# Patient Record
Sex: MALE | Race: BLACK OR AFRICAN AMERICAN | NOT HISPANIC OR LATINO | Employment: OTHER | URBAN - METROPOLITAN AREA
[De-identification: names, ages, dates, MRNs, and addresses within clinical notes are randomized per-mention and may not be internally consistent; named-entity substitution may affect disease eponyms.]

---

## 2024-03-15 PROBLEM — D84.9 IMMUNOSUPPRESSION (MULTI): Status: ACTIVE | Noted: 2024-03-15

## 2024-03-15 PROBLEM — E78.2 MIXED HYPERLIPIDEMIA: Status: ACTIVE | Noted: 2024-03-15

## 2024-03-15 PROBLEM — Z86.73 H/O: CVA (CEREBROVASCULAR ACCIDENT): Status: ACTIVE | Noted: 2024-03-15

## 2024-03-15 PROBLEM — E66.811 CLASS 1 OBESITY WITH BODY MASS INDEX (BMI) OF 31.0 TO 31.9 IN ADULT: Status: ACTIVE | Noted: 2024-03-15

## 2024-03-15 PROBLEM — E11.9 DIABETES MELLITUS (MULTI): Status: ACTIVE | Noted: 2024-03-15

## 2024-03-15 PROBLEM — I10 ESSENTIAL HYPERTENSION: Status: ACTIVE | Noted: 2024-03-15

## 2024-03-15 PROBLEM — Z94.0 HISTORY OF KIDNEY TRANSPLANT (HHS-HCC): Status: ACTIVE | Noted: 2024-03-15

## 2024-03-15 PROBLEM — I25.10 CAD S/P PERCUTANEOUS CORONARY ANGIOPLASTY: Status: ACTIVE | Noted: 2024-03-15

## 2024-03-15 PROBLEM — Z78.9 NEVER SMOKED ANY SUBSTANCE: Status: ACTIVE | Noted: 2024-03-15

## 2024-03-15 PROBLEM — N52.9 MALE ERECTILE DISORDER OF ORGANIC ORIGIN: Status: ACTIVE | Noted: 2024-03-15

## 2024-03-15 PROBLEM — E66.9 CLASS 1 OBESITY WITH BODY MASS INDEX (BMI) OF 31.0 TO 31.9 IN ADULT: Status: ACTIVE | Noted: 2024-03-15

## 2024-03-15 PROBLEM — E66.3 OVERWEIGHT WITH BODY MASS INDEX (BMI) OF 29 TO 29.9 IN ADULT: Status: ACTIVE | Noted: 2024-03-15

## 2024-03-15 PROBLEM — E66.9 OBESITY: Status: ACTIVE | Noted: 2024-03-15

## 2024-03-15 PROBLEM — Z98.61 CAD S/P PERCUTANEOUS CORONARY ANGIOPLASTY: Status: ACTIVE | Noted: 2024-03-15

## 2024-03-15 RX ORDER — METOPROLOL TARTRATE 50 MG/1
50 TABLET ORAL
COMMUNITY
Start: 2019-02-04

## 2024-03-15 RX ORDER — ASPIRIN 81 MG/1
81 TABLET ORAL DAILY
COMMUNITY
Start: 2015-11-09

## 2024-03-15 RX ORDER — PREDNISONE 5 MG/1
5 TABLET ORAL DAILY
COMMUNITY
Start: 2015-11-09

## 2024-03-15 RX ORDER — ATORVASTATIN CALCIUM 40 MG/1
40 TABLET, FILM COATED ORAL DAILY
COMMUNITY
Start: 2015-11-09

## 2024-03-15 RX ORDER — AMLODIPINE BESYLATE 5 MG/1
5 TABLET ORAL DAILY
COMMUNITY
Start: 2022-03-01

## 2024-03-15 RX ORDER — DOCUSATE SODIUM 100 MG/1
100 CAPSULE, LIQUID FILLED ORAL 2 TIMES DAILY
COMMUNITY
Start: 2016-03-04

## 2024-03-15 RX ORDER — BRIMONIDINE TARTRATE AND TIMOLOL MALEATE 2; 5 MG/ML; MG/ML
SOLUTION OPHTHALMIC
COMMUNITY
Start: 2016-04-13

## 2024-03-15 RX ORDER — TRAVOPROST OPHTHALMIC SOLUTION 0.04 MG/ML
1 SOLUTION OPHTHALMIC NIGHTLY
COMMUNITY
Start: 2016-04-18

## 2024-03-15 RX ORDER — BRINZOLAMIDE 10 MG/ML
1 SUSPENSION/ DROPS OPHTHALMIC 2 TIMES DAILY
COMMUNITY
Start: 2016-04-18

## 2024-03-15 RX ORDER — QUINIDINE SULFATE 200 MG
TABLET ORAL
COMMUNITY
Start: 2022-03-01

## 2024-03-15 RX ORDER — MYCOPHENOLATE MOFETIL 250 MG/1
250 CAPSULE ORAL 2 TIMES DAILY
COMMUNITY
Start: 2015-11-09

## 2024-03-15 RX ORDER — INSULIN GLARGINE 100 [IU]/ML
INJECTION, SOLUTION SUBCUTANEOUS
COMMUNITY
Start: 2015-11-09

## 2024-07-24 ENCOUNTER — APPOINTMENT (OUTPATIENT)
Dept: CARDIOLOGY | Facility: CLINIC | Age: 64
End: 2024-07-24
Payer: MEDICARE

## 2024-07-24 VITALS
BODY MASS INDEX: 26.63 KG/M2 | WEIGHT: 180.3 LBS | DIASTOLIC BLOOD PRESSURE: 64 MMHG | HEART RATE: 90 BPM | SYSTOLIC BLOOD PRESSURE: 98 MMHG

## 2024-07-24 DIAGNOSIS — Z98.61 CAD S/P PERCUTANEOUS CORONARY ANGIOPLASTY: ICD-10-CM

## 2024-07-24 DIAGNOSIS — Z78.9 NEVER SMOKED ANY SUBSTANCE: ICD-10-CM

## 2024-07-24 DIAGNOSIS — Z86.73 H/O: CVA (CEREBROVASCULAR ACCIDENT): ICD-10-CM

## 2024-07-24 DIAGNOSIS — E11.9 TYPE 2 DIABETES MELLITUS WITHOUT COMPLICATION, WITHOUT LONG-TERM CURRENT USE OF INSULIN (MULTI): ICD-10-CM

## 2024-07-24 DIAGNOSIS — I10 ESSENTIAL HYPERTENSION: ICD-10-CM

## 2024-07-24 DIAGNOSIS — E78.2 MIXED HYPERLIPIDEMIA: ICD-10-CM

## 2024-07-24 DIAGNOSIS — I25.10 CAD S/P PERCUTANEOUS CORONARY ANGIOPLASTY: ICD-10-CM

## 2024-07-24 DIAGNOSIS — Z94.0 HISTORY OF KIDNEY TRANSPLANT (HHS-HCC): ICD-10-CM

## 2024-07-24 PROCEDURE — 3078F DIAST BP <80 MM HG: CPT | Performed by: INTERNAL MEDICINE

## 2024-07-24 PROCEDURE — 99214 OFFICE O/P EST MOD 30 MIN: CPT | Performed by: INTERNAL MEDICINE

## 2024-07-24 PROCEDURE — 3074F SYST BP LT 130 MM HG: CPT | Performed by: INTERNAL MEDICINE

## 2024-07-24 PROCEDURE — 1036F TOBACCO NON-USER: CPT | Performed by: INTERNAL MEDICINE

## 2024-07-24 NOTE — PROGRESS NOTES
CARDIOLOGY OFFICE VISIT      CHIEF COMPLAINT      HISTORY OF PRESENT ILLNESS  The patient states she has been doing well.  He denies chest discomfort or symptoms of myocardial ischemia.  He has not used nitroglycerin.  He denies dyspnea with exertion.  He denies palpitations and syncope.  He denies any problem with his current medication.  He states he has lab work done on regular basis [facility] and his cholesterol is good.      IMPRESSION:   1. Coronary artery disease, no angina.  2. Remote percutaneous coronary intervention.  3. Essential hypertension.  4. Mixed hyperlipidemia.  5. Diabetes mellitus type 2, requiring insulin control.  6. Obesity  7. Renal transplantation at [facility] April 2013.  8. Remote CVA  9. Vitiligo     Please excuse any errors in grammar or translation related to this dictation. Voice recognition software was utilized to prepare this document.     Past Medical History  Past Medical History:   Diagnosis Date    Other specified diabetes mellitus with other diabetic kidney complication (Multi) 06/02/2016    Diabetes mellitus of other type with microalbuminuria, without long-term current use of insulin    Personal history of other endocrine, nutritional and metabolic disease     History of diabetes mellitus    Presence of urogenital implants     History of penile implant       Social History  Social History     Tobacco Use    Smoking status: Not on file    Smokeless tobacco: Not on file   Substance Use Topics    Alcohol use: Not on file    Drug use: Not on file       Family History     Family History   Problem Relation Name Age of Onset    Other (arteriosclerotic cardiovascular disease) Mother      Other (cardiac disorder) Mother      Other (diabetes mellitus) Mother      Other (cardiac disorder) Father      Other (diabetes mellitus) Father          Allergies:  No Known Allergies     Outpatient Medications:  Current Outpatient Medications   Medication Instructions    amLODIPine  (NORVASC) 5 mg, oral, Daily    aspirin 81 mg, oral, Daily    atorvastatin (LIPITOR) 40 mg, oral, Daily    brimonidine-timoloL (Combigan) 0.2-0.5 % ophthalmic solution ophthalmic (eye)    brinzolamide (Azopt) 1 % ophthalmic suspension 1 drop, ophthalmic (eye), 2 times daily    docusate sodium (COLACE) 100 mg, oral, 2 times daily    empagliflozin (JARDIANCE ORAL) 1 tablet, oral, Daily    insulin glargine (Lantus U-100 Insulin) 100 unit/mL injection subcutaneous, INJECT SUBCUTANEOUSLY as DIRECTED 35 UNITS EVERY DAY    metoprolol tartrate (LOPRESSOR) 50 mg, oral, EVERY 12 HOURS DAILY    mv-mn-iron-FA-herbal cmplx#190 (Vitamin D3 Complete) 18 mg iron-800 mcg-150 mg tablet oral, TAKE as DIRECTED    mycophenolate (CELLCEPT) 250 mg, oral, 2 times daily    predniSONE (DELTASONE) 5 mg, oral, Daily    semaglutide (OZEMPIC SUBQ) 1 mg, subcutaneous, Weekly, (Ozempic (1MG/DOSE) SOPN;  ONCE WEEKLY    tacrolimus 0.1 mg capsule 0.1 mg, oral, 2 times daily    travoprost (Travatan Z) 0.004 % drops ophthalmic solution 1 drop, Both Eyes, Nightly          REVIEW OF SYSTEMS  Review of Systems   All other systems reviewed and are negative.        VITALS  There were no vitals filed for this visit.    PHYSICAL EXAM  Constitutional:       Appearance: Healthy appearance. Not in distress.   Neck:      Vascular: No JVR. JVD normal.   Pulmonary:      Effort: Pulmonary effort is normal.      Breath sounds: Normal breath sounds. No wheezing. No rhonchi. No rales.   Chest:      Chest wall: Not tender to palpatation.   Cardiovascular:      PMI at left midclavicular line. Normal rate. Regular rhythm. Normal S1. Normal S2.       Murmurs: There is no murmur.      No gallop.  No click. No rub.   Pulses:     Intact distal pulses.   Edema:     Peripheral edema absent.   Abdominal:      General: Bowel sounds are normal.      Palpations: Abdomen is soft.      Tenderness: There is no abdominal tenderness.   Musculoskeletal: Normal range of motion.          General: No tenderness. Skin:     General: Skin is warm and dry.   Neurological:      General: No focal deficit present.      Mental Status: Alert and oriented to person, place and time.           ASSESSMENT AND PLAN  Diagnoses and all orders for this visit:  CAD S/P percutaneous coronary angioplasty  Essential hypertension  Mixed hyperlipidemia  H/O: CVA (cerebrovascular accident)  History of kidney transplant ([facility])  Type 2 diabetes mellitus without complication, without long-term current use of insulin (Multi)  Never smoked any substance      [unfilled]

## 2024-07-24 NOTE — PATIENT INSTRUCTIONS
Continue same medications and treatments.   Patient educated on proper medication use.   Patient educated on risk factor modification.   Please bring any lab results from other providers / physicians to your next appointment.     Please bring all medicines, vitamins, and herbal supplements with you when you come to the office.     Prescriptions will not be filled unless you are compliant with your follow up appointments or have a follow up appointment scheduled as per instruction of your physician. Refills should be requested at the time of your visit.    FOLLOW UP IN 1 YEAR    I, Candelaria Nolan LPN, am scribing for and in the presence of Dr. Castillo Pompa MD, FACC

## 2025-07-23 ENCOUNTER — APPOINTMENT (OUTPATIENT)
Dept: CARDIOLOGY | Facility: CLINIC | Age: 65
End: 2025-07-23
Payer: MEDICARE

## 2025-07-30 ENCOUNTER — APPOINTMENT (OUTPATIENT)
Dept: CARDIOLOGY | Facility: CLINIC | Age: 65
End: 2025-07-30
Payer: MEDICARE

## 2025-07-30 VITALS
WEIGHT: 191.2 LBS | DIASTOLIC BLOOD PRESSURE: 66 MMHG | BODY MASS INDEX: 27.37 KG/M2 | SYSTOLIC BLOOD PRESSURE: 120 MMHG | HEART RATE: 80 BPM | HEIGHT: 70 IN

## 2025-07-30 DIAGNOSIS — E78.2 MIXED HYPERLIPIDEMIA: ICD-10-CM

## 2025-07-30 DIAGNOSIS — Z94.0 HISTORY OF KIDNEY TRANSPLANT (HHS-HCC): ICD-10-CM

## 2025-07-30 DIAGNOSIS — Z86.73 H/O: CVA (CEREBROVASCULAR ACCIDENT): ICD-10-CM

## 2025-07-30 DIAGNOSIS — I25.10 CAD S/P PERCUTANEOUS CORONARY ANGIOPLASTY: ICD-10-CM

## 2025-07-30 DIAGNOSIS — Z78.9 NEVER SMOKED ANY SUBSTANCE: ICD-10-CM

## 2025-07-30 DIAGNOSIS — I10 ESSENTIAL HYPERTENSION: ICD-10-CM

## 2025-07-30 DIAGNOSIS — Z98.61 CAD S/P PERCUTANEOUS CORONARY ANGIOPLASTY: ICD-10-CM

## 2025-07-30 PROCEDURE — 99214 OFFICE O/P EST MOD 30 MIN: CPT | Performed by: INTERNAL MEDICINE

## 2025-07-30 PROCEDURE — 3074F SYST BP LT 130 MM HG: CPT | Performed by: INTERNAL MEDICINE

## 2025-07-30 PROCEDURE — 1036F TOBACCO NON-USER: CPT | Performed by: INTERNAL MEDICINE

## 2025-07-30 PROCEDURE — 3078F DIAST BP <80 MM HG: CPT | Performed by: INTERNAL MEDICINE

## 2025-07-30 PROCEDURE — 3008F BODY MASS INDEX DOCD: CPT | Performed by: INTERNAL MEDICINE

## 2025-07-30 PROCEDURE — 1159F MED LIST DOCD IN RCRD: CPT | Performed by: INTERNAL MEDICINE

## 2025-07-30 NOTE — PROGRESS NOTES
CARDIOLOGY OFFICE VISIT      CHIEF COMPLAINT  Chief Complaint   Patient presents with    Follow-up     1 year follow-up for management of CAD, hypertension & hyperlipidemia        HISTORY OF PRESENT ILLNESS  The patient states he has been feeling well.  He denies chest discomfort or symptoms of myocardial ischemia.  He has not had to take any nitroglycerin.  He denies dyspnea with activities.  He denies palpitations and syncope.  He denies any problems or medications.  [facility] and he states that his lipids and his renal function are good.    IMPRESSION:   1. Coronary artery disease, no angina.  2. Remote percutaneous coronary intervention.  3. Essential hypertension.  4. Mixed hyperlipidemia.  5. Diabetes mellitus type 2, requiring insulin control.  6. Obesity  7. Renal transplantation at [facility] April 2013.  8. Remote CVA  9. Vitiligo     Please excuse any errors in grammar or translation related to this dictation. Voice recognition software was utilized to prepare this document.            Past Medical History  Medical History[1]    Social History  Social History[2]    Family History   Family History[3]     Allergies:  RX Allergies[4]     Outpatient Medications:  Current Outpatient Medications   Medication Instructions    amLODIPine (NORVASC) 5 mg, Daily    aspirin 81 mg, Daily    atorvastatin (LIPITOR) 40 mg, Daily    brimonidine-timoloL (Combigan) 0.2-0.5 % ophthalmic solution Administer into affected eye(s).    brinzolamide (Azopt) 1 % ophthalmic suspension 1 drop, 2 times daily    docusate sodium (COLACE) 100 mg, 2 times daily    empagliflozin (JARDIANCE ORAL) 1 tablet, Daily    insulin glargine (Lantus U-100 Insulin) 100 unit/mL injection Inject under the skin. INJECT SUBCUTANEOUSLY as DIRECTED 35 UNITS EVERY DAY    metoprolol tartrate (LOPRESSOR) 50 mg    mv-mn-iron-FA-herbal cmplx#190 (Vitamin D3 Complete) 18 mg iron-800 mcg-150 mg tablet Take by mouth. TAKE as DIRECTED     mycophenolate (CELLCEPT) 750 mg, 2 times daily    predniSONE (DELTASONE) 5 mg, Daily    semaglutide (OZEMPIC SUBQ) 1 mg, Weekly (0600)    tacrolimus 0.1 mg capsule 0.1 mg, 2 times daily    travoprost (Travatan Z) 0.004 % drops ophthalmic solution 1 drop, Nightly          REVIEW OF SYSTEMS  Review of Systems   All other systems reviewed and are negative.        VITALS  Vitals:    07/30/25 1423   BP: 120/66   Pulse: 80       PHYSICAL EXAM  Constitutional:       Appearance: Healthy appearance. Not in distress.   Eyes:      Conjunctiva/sclera: Conjunctivae normal.      Pupils: Pupils are equal, round, and reactive to light.   Neck:      Vascular: No JVR. JVD normal.   Pulmonary:      Effort: Pulmonary effort is normal.      Breath sounds: Normal breath sounds. No wheezing. No rhonchi. No rales.   Chest:      Chest wall: Not tender to palpatation.   Cardiovascular:      PMI at left midclavicular line. Normal rate. Regular rhythm. Normal S1. Normal S2.       Murmurs: There is no murmur.      No gallop.  No click. No rub.   Pulses:     Intact distal pulses.   Edema:     Peripheral edema absent.   Abdominal:      Tenderness: There is no abdominal tenderness.   Musculoskeletal: Normal range of motion.         General: No tenderness.      Cervical back: Normal range of motion. Skin:     General: Skin is warm and dry.   Neurological:      General: No focal deficit present.      Mental Status: Alert and oriented to person, place and time.         ASSESSMENT AND PLAN  Diagnoses and all orders for this visit:  CAD S/P percutaneous coronary angioplasty  Essential hypertension  Mixed hyperlipidemia  [facility]  H/O: CVA (cerebrovascular accident)  BMI 27.0-27.9,adult  Never smoked any substance      [unfilled]      I,Mary Patton LPN am scribing for, and in the presence of Dr. Castillo Pompa.    I, Dr. Castillo Pompa, personally performed the services described in the  documentation as scribed by Mary Patton LPN in my presence, and confirm it is both accurate and complete.      Dr. Casitllo Velázquez MD  Thank you for allowing me to participate in the care of this patient. Please do not hesitate to contact me with any further questions or concerns.         [1]   Past Medical History:  Diagnosis Date    Other specified diabetes mellitus with other diabetic kidney complication 06/02/2016    Diabetes mellitus of other type with microalbuminuria, without long-term current use of insulin    Personal history of other endocrine, nutritional and metabolic disease     History of diabetes mellitus    Presence of urogenital implants     History of penile implant   [2]   Social History  Tobacco Use    Smoking status: Never    Smokeless tobacco: Never   Substance Use Topics    Alcohol use: Not Currently    Drug use: Never   [3]   Family History  Problem Relation Name Age of Onset    Other (arteriosclerotic cardiovascular disease) Mother      Other (cardiac disorder) Mother      Other (diabetes mellitus) Mother      Other (cardiac disorder) Father      Other (diabetes mellitus) Father     [4] No Known Allergies

## 2026-07-30 ENCOUNTER — APPOINTMENT (OUTPATIENT)
Dept: CARDIOLOGY | Facility: CLINIC | Age: 66
End: 2026-07-30
Payer: MEDICARE